# Patient Record
Sex: MALE | URBAN - METROPOLITAN AREA
[De-identification: names, ages, dates, MRNs, and addresses within clinical notes are randomized per-mention and may not be internally consistent; named-entity substitution may affect disease eponyms.]

---

## 2017-01-05 NOTE — PATIENT DISCUSSION
*NTG, OU: INTRAOCULAR PRESSURE IS WITHIN ACCEPTABLE LIMITS. PT IS UNABLE TO TAKE PRESCRIPTION EYE DROPS TO TREAT GLAUCOMA DUE TO SEVERE ALLERGIES TO MANY DIFFERENT DROPS. CONTINUE TO MONITOR CLOSELY. CONSIDER SLT OU. RETURN FOR FOLLOW-UP AS SCHEDULED.

## 2017-01-05 NOTE — PATIENT DISCUSSION
NTG counseling:  I have explained to the patient at length the diagnosis of  glaucoma and its pathophysiology. The presence of undetectable visual field loss and/or optic nerve damage with normal interocular pressures was discussed with the patient. I have discussed the risks, benefits, and alternatives of treatment as well as the risk factors for glaucoma. The patient understands and agrees with treatment plan and close observation. Return  for follow-up as scheduled.

## 2017-07-10 NOTE — PATIENT DISCUSSION
*NTG, OU: INTRAOCULAR PRESSURE IS WITHIN ACCEPTABLE LIMITS. PT IS UNABLE TO TAKE PRESCRIPTION EYE DROPS TO TREAT GLAUCOMA DUE TO SEVERE ALLERGIES TO MANY DIFFERENT DROPS. CONTINUE TO MONITOR CLOSELY. CONSIDER SLT OU.  RETURN FOR FOLLOW-UP AS SCHEDULED

## 2018-01-11 NOTE — PATIENT DISCUSSION
*NTG, OU: INTRAOCULAR PRESSURE IS WITHIN ACCEPTABLE LIMITS WITH NO DROP USE AT THIS TIME. PATIENT UNABLE TO TAKE ANY DROPS DUE TO SENSITIVITIES. MONITOR IOP CLOSELY FOR ANY ELEVATION. IF ELEVATION OCCURS, REFER TO DR. Gale Perez FOR SLT OU. RETURN FOR AS SCHEDULED.

## 2018-05-11 NOTE — PATIENT DISCUSSION
GLASSES CHECK: NO CHANGE IN MR. INCORRECT SIGN ON SPEC RX PROVIDED AT LAST VISIT. NEW SPEC RX PROVIDED AND PATIENT TO TAKE GLASSES BACK TO [employer] FOR WARRANTY REMAKE.

## 2018-12-11 NOTE — PATIENT DISCUSSION
NTG, OU: INTRAOCULAR PRESSURE IS WITHIN ACCEPTABLE LIMITS WITHOUT THE USE OF TOPICAL THERAPY AT THIS TIME. PT IS UNABLE TO TAKE PRESCRIPTION EYE DROPS TO TREAT GLAUCOMA DUE TO SEVERE ALLERGIES TO MANY DIFFERENT DROPS. CONTINUE TO MONITOR CLOSELY. CONSIDER SLT OU IF NEEDED.  RETURN FOR FOLLOW-UP AS SCHEDULED

## 2018-12-11 NOTE — PATIENT DISCUSSION
NTG (F/U) Counseling:  Intraocular pressure is within acceptable limits. I have stressed the importance of compliance with follow up appointments. Patient instructed to follow-up as scheduled.

## 2019-01-14 NOTE — PATIENT DISCUSSION
DERMATOCHALASIS / PTOSIS RUL AND JONATHAN :  VISUALLY SIGNIFICANT TO PATIENT. SCHEDULE WITH OCULOPLASTIC SPECIALIST IF PATIENT DESIRES.

## 2020-05-14 NOTE — PATIENT DISCUSSION
*NTG, OU: INTRAOCULAR PRESSURE IS WITHIN ACCEPTABLE LIMITS WITHOUT USE OF DROPS. RETURN FOR FOLLOW-UP AS SCHEDULED.

## 2020-08-03 ENCOUNTER — IMPORTED ENCOUNTER (OUTPATIENT)
Dept: URBAN - METROPOLITAN AREA CLINIC 9 | Facility: CLINIC | Age: 25
End: 2020-08-03

## 2020-10-29 NOTE — PATIENT DISCUSSION
*NTG, OU: INTRAOCULAR PRESSURE IS WITHIN ACCEPTABLE LIMITS WITHOUT USE OF DROPS AND STABLE ONH OCT TODAY WITHOUT THINNING OU. RETURN FOR FOLLOW-UP AS SCHEDULED.

## 2021-03-01 NOTE — PATIENT DISCUSSION
*NTG, OU: INTRAOCULAR PRESSURE IS WITHIN ACCEPTABLE LIMITS WITHOUT DROPS. RETURN FOR FOLLOW-UP AS SCHEDULED.

## 2021-09-20 NOTE — PATIENT DISCUSSION
*NTG, OU: INTRAOCULAR PRESSURE IS WITHIN ACCEPTABLE LIMITS WITHOUT USE OF DROPS. CONTINUE TO MONITOR CLOSELY. RETURN FOR FOLLOW-UP AS SCHEDULED.

## 2021-10-16 ASSESSMENT — TONOMETRY
OD_IOP_MMHG: 15
OS_IOP_MMHG: 14

## 2021-10-16 ASSESSMENT — VISUAL ACUITY
OS_SC: 20/20 SN
OD_SC: 20/20 SN

## 2021-12-27 NOTE — PATIENT DISCUSSION
HVF today poor reliability OU, but WNL OU. No treatment indicated at this time - ok to continue without gtt. Follow-up as directed. Monitor.

## 2022-02-24 NOTE — PATIENT DISCUSSION
Educated patient on findings and condition. Prescribe warm compresses BID/prn OU (recommend Karlos mask) and 2000mg fish oil at least QD PO. Advised to call/RTC if si/sx persist or worsen. Monitor.

## 2022-07-04 RX ORDER — FLUTICASONE PROPIONATE 50 MCG
SPRAY, SUSPENSION (ML) NASAL
COMMUNITY

## 2022-07-04 RX ORDER — CETIRIZINE HYDROCHLORIDE 10 MG/1
TABLET ORAL
COMMUNITY

## 2022-07-04 RX ORDER — ELETRIPTAN HYDROBROMIDE 40 MG/1
TABLET, FILM COATED ORAL
COMMUNITY

## 2022-08-30 NOTE — PATIENT DISCUSSION
Educated patient on findings, condition, and affect on vision. Prescribe artificial tears (recommend Retaine MGD or Refresh MEGA3) BID-QID OU. Continue gel drop qhs OU. Restart warm compresses with massage/expression with Karlos mask QD-BID OU. Continue Omega-3 supplementation. Monitor at follow-up with Dr. Brittney Hess in 1 month or sooner prn. Monitor.

## 2022-10-03 NOTE — PATIENT DISCUSSION
Educated patient on findings, condition, and affect on vision. Prescribe artificial tears (recommend Retaine MGD or Refresh MEGA3) BID-QID OU. Continue gel drop qhs OU. Restart warm compresses with massage/expression with Karlos mask QD-BID OU. Continue Omega-3 supplementation. Monitor at follow-up with Dr. Christel Porter in 1 month or sooner prn. Monitor.

## 2023-01-09 NOTE — PATIENT DISCUSSION
Educated patient on findings, condition, and affect on vision. Prescribe artificial tears (recommend Retaine MGD or Refresh MEGA3) BID-QID OU. Continue gel drop qhs OU. Restart warm compresses with massage/expression with Karlos mask QD-BID OU. Continue Omega-3 supplementation. Monitor at follow-up with Dr. Seven Wick in 1 month or sooner prn. Monitor.

## 2023-01-09 NOTE — PATIENT DISCUSSION
HVF 24-2 today WNL OU. No treatment indicated at this time - ok to continue without gtt. Follow-up as directed. Monitor.

## 2023-02-08 NOTE — PATIENT DISCUSSION
Progress Note    Chief Complaint:  Chief Complaint   Patient presents with   • Drug Problem      Subjective:  Doing OK today  Has some nausea and vague abdominal discomfort  Tylenol level undetectable last night but LFTs elevated; , . Poison control recommended another round of NAC.       Current Medications:  Current Facility-Administered Medications   Medication Dose Route Frequency Provider Last Rate Last Admin   • acetylcysteine (ACETADOTE) 25,000 mg in dextrose 5 % 1,000 mL IVPB  25,000 mg Intravenous Once Kathya Lam MD 50 mL/hr at 02/08/23 0118 25,000 mg at 02/08/23 0118   • sodium chloride 0.9 % flush bag 25 mL  25 mL Intravenous PRN Nichole Nelson MD       • sodium chloride (PF) 0.9 % injection 2 mL  2 mL Intracatheter 2 times per day Nichole Nelson MD   2 mL at 02/07/23 2307   • heparin (porcine) injection 5,000 Units  5,000 Units Subcutaneous 3 times per day Nichole Nelson MD   5,000 Units at 02/07/23 2309   • gabapentin (NEURONTIN) capsule 600 mg  600 mg Oral 4x Daily Angel Carlos MD   600 mg at 02/08/23 1350   • hydrOXYzine (ATARAX) tablet 50 mg  50 mg Oral BID PRN Angel Carlos MD       • lamoTRIgine (LaMICtal) tablet 150 mg  150 mg Oral BID Angel Carlos MD   150 mg at 02/08/23 0914   • prazosin (MINIPRESS) capsule 8 mg  8 mg Oral Nightly Angel Carlos MD   8 mg at 02/07/23 2305   • propRANolol (INDERAL LA) 24 hr capsule 60 mg  60 mg Oral Daily Angel Carlos MD   60 mg at 02/08/23 0914   • SUMAtriptan (IMITREX) tablet 100 mg  100 mg Oral Q2H PRN Angel Carlos MD       • topiramate (TOPAMAX) tablet 100 mg  100 mg Oral BID Angel Carlos MD   100 mg at 02/08/23 0914   • traZODone (DESYREL) tablet 100 mg  100 mg Oral Nightly PRN Angel Carlos MD       • vortioxetine (TRINTELLIX) tablet 20 mg  20 mg Oral Daily Angel Carlos MD   20 mg at 02/08/23 0914   • albuterol (VENTOLIN) nebulizer 2.5 mg  2.5 mg Nebulization Q4H Resp PRN Angel Carlos MD       • ondansetron (ZOFRAN)  Slab Off:No injection 4 mg  4 mg Intravenous Q6H PRN Angel Carlos MD   4 mg at 02/08/23 1033   • sodium chloride 0.9 % flush bag 25 mL  25 mL Intravenous PRN Kathya Lam MD       • Potassium Standard Replacement Protocol (Levels 3.5 and lower)   Does not apply See Admin Instructions Kathya Lam MD         I/O:    Intake/Output Summary (Last 24 hours) at 2/8/2023 1602  Last data filed at 2/8/2023 1000  Gross per 24 hour   Intake 470 ml   Output --   Net 470 ml     Physical Exam  Temp:  [97.9 °F (36.6 °C)-98.6 °F (37 °C)] 98.1 °F (36.7 °C)  Heart Rate:  [62-73] 62  Resp:  [18-20] 18  BP: (105-134)/(69-84) 117/72  Physical Exam  Constitutional:       Appearance: She is not toxic-appearing.   HENT:      Head: Normocephalic.      Mouth/Throat:      Mouth: Mucous membranes are moist.   Eyes:      Extraocular Movements: Extraocular movements intact.      Conjunctiva/sclera: Conjunctivae normal.      Pupils: Pupils are equal, round, and reactive to light.   Cardiovascular:      Rate and Rhythm: Normal rate and regular rhythm.      Heart sounds: No murmur heard.  Pulmonary:      Effort: No respiratory distress.      Breath sounds: Normal breath sounds. No wheezing.   Abdominal:      General: There is no distension.      Palpations: Abdomen is soft.      Comments: Mild generalized abdominal discomfort.    Musculoskeletal:         General: No swelling.   Skin:     Findings: No rash.   Neurological:      General: No focal deficit present.      Mental Status: She is alert.      Cranial Nerves: No cranial nerve deficit.   Psychiatric:         Mood and Affect: Mood normal.       Labs  Recent Results (from the past 24 hour(s))   Comprehensive Metabolic Panel    Collection Time: 02/07/23  7:58 PM   Result Value Ref Range    Fasting Status      Sodium 141 135 - 145 mmol/L    Potassium 3.1 (L) 3.4 - 5.1 mmol/L    Chloride 109 97 - 110 mmol/L    Carbon Dioxide 22 21 - 32 mmol/L    Anion Gap 13 7 - 19 mmol/L    Glucose 113 (H) 70 - 99 mg/dL     BUN 6 6 - 20 mg/dL    Creatinine 0.59 0.51 - 0.95 mg/dL    Glomerular Filtration Rate >90 >=60    BUN/ Creatinine Ratio 10 7 - 25    Calcium 8.4 8.4 - 10.2 mg/dL    Bilirubin, Total 0.6 0.2 - 1.0 mg/dL    GOT/ (H) <=37 Units/L    GPT/ (H) <64 Units/L    Alkaline Phosphatase 45 45 - 117 Units/L    Albumin 3.1 (L) 3.6 - 5.1 g/dL    Protein, Total 6.3 (L) 6.4 - 8.2 g/dL    Globulin 3.2 2.0 - 4.0 g/dL    A/G Ratio 1.0 1.0 - 2.4   Prothrombin Time    Collection Time: 02/07/23  7:58 PM   Result Value Ref Range    Prothrombin Time 16.1 (H) 9.7 - 11.8 sec    INR 1.6     Acetaminophen Level    Collection Time: 02/07/23  7:58 PM   Result Value Ref Range    Acetaminophen <2 (L) 10 - 30 mcg/mL   Potassium    Collection Time: 02/08/23  7:38 AM   Result Value Ref Range    Potassium 3.7 3.4 - 5.1 mmol/L     Imaging  No orders to display         Assessment and Plan  Eda Zendejas  is a 37 year old female w PTSD, anxiety, depression, GERD, fibromyalgia, pseudoseizures, migraines, IBD, beta thalassemia, and asthma admitted with tylenol overdose.      Tylenol Overdose with Hepatotoxicity   - pt denies it was intentional, says she was taking 2,000 mg tylenol every 4 hours plus fioricet   -tylenol level in toxic range on admission, on NAC per protocol. Poison control involved.   - LFTs trending up,  , . Cont to trend per poison control recs - next check 1700 today. Tylenol level undetectable now.   - cleared by psychiatry      Anxiety / PTSD / Depression  - psych eval pending  - cont psych meds     Chronic: cont home meds  GERD  Fibromyalgia  IBD  Pseudoseizures  Migraines  Asthma      VTE ppx: SQH     Dispo: Admitted inpatient. ADOD 2 days.      D/w Rn     PCP: Ashley Hidalgo MD     Concerns regarding plan of care were discussed with patient. Patient agrees with plan as detailed above.     Code Status:    Code Status Information     Code Status    Full Resuscitation         Angel Carlos MD  2/8/2023